# Patient Record
Sex: FEMALE | Race: WHITE | Employment: OTHER | URBAN - METROPOLITAN AREA
[De-identification: names, ages, dates, MRNs, and addresses within clinical notes are randomized per-mention and may not be internally consistent; named-entity substitution may affect disease eponyms.]

---

## 2017-01-30 PROBLEM — L84 TYLOMA: Status: ACTIVE | Noted: 2017-01-30

## 2017-03-03 PROBLEM — I77.1 TORTUOUS AORTA (HCC): Status: ACTIVE | Noted: 2017-03-03

## 2017-05-02 PROCEDURE — 82607 VITAMIN B-12: CPT | Performed by: OTHER

## 2017-08-04 PROBLEM — M17.11 PRIMARY OSTEOARTHRITIS OF RIGHT KNEE: Status: ACTIVE | Noted: 2017-08-04

## 2017-09-13 PROBLEM — C79.51 BONE METASTASES (HCC): Status: ACTIVE | Noted: 2017-09-13

## 2018-01-09 PROBLEM — D70.1 CHEMOTHERAPY-INDUCED NEUTROPENIA (HCC): Status: ACTIVE | Noted: 2018-01-09

## 2018-01-09 PROBLEM — T45.1X5A CHEMOTHERAPY-INDUCED NEUTROPENIA (HCC): Status: ACTIVE | Noted: 2018-01-09

## 2018-01-09 PROBLEM — E27.8 ADRENAL NODULE (HCC): Status: ACTIVE | Noted: 2018-01-09

## 2018-01-09 PROBLEM — J84.10 LUNG GRANULOMA (HCC): Status: ACTIVE | Noted: 2018-01-09

## 2018-01-11 ENCOUNTER — NURSE ONLY (OUTPATIENT)
Dept: LAB | Age: 78
End: 2018-01-11
Attending: INTERNAL MEDICINE
Payer: MEDICARE

## 2018-01-11 ENCOUNTER — LAB REQUISITION (OUTPATIENT)
Dept: LAB | Facility: HOSPITAL | Age: 78
End: 2018-01-11
Attending: INTERNAL MEDICINE
Payer: MEDICARE

## 2018-01-11 DIAGNOSIS — Z87.81 S/P RIGHT HIP FRACTURE: Primary | ICD-10-CM

## 2018-01-11 DIAGNOSIS — Z00.00 ENCOUNTER FOR GENERAL ADULT MEDICAL EXAMINATION WITHOUT ABNORMAL FINDINGS: ICD-10-CM

## 2018-01-11 LAB
ALBUMIN SERPL-MCNC: 3 G/DL (ref 3.5–4.8)
ALP LIVER SERPL-CCNC: 46 U/L (ref 55–142)
ALT SERPL-CCNC: 27 U/L (ref 14–54)
AST SERPL-CCNC: 20 U/L (ref 15–41)
BASOPHILS # BLD AUTO: 0.02 X10(3) UL (ref 0–0.1)
BASOPHILS NFR BLD AUTO: 0.6 %
BILIRUB SERPL-MCNC: 0.5 MG/DL (ref 0.1–2)
BUN BLD-MCNC: 7 MG/DL (ref 8–20)
CALCIUM BLD-MCNC: 8.4 MG/DL (ref 8.3–10.3)
CHLORIDE: 106 MMOL/L (ref 101–111)
CO2: 23 MMOL/L (ref 22–32)
CREAT BLD-MCNC: 0.35 MG/DL (ref 0.55–1.02)
EOSINOPHIL # BLD AUTO: 0.04 X10(3) UL (ref 0–0.3)
EOSINOPHIL NFR BLD AUTO: 1.1 %
ERYTHROCYTE [DISTWIDTH] IN BLOOD BY AUTOMATED COUNT: 15.9 % (ref 11.5–16)
GLUCOSE BLD-MCNC: 91 MG/DL (ref 70–99)
HCT VFR BLD AUTO: 33.9 % (ref 34–50)
HGB BLD-MCNC: 11.1 G/DL (ref 12–16)
IMMATURE GRANULOCYTE COUNT: 0.03 X10(3) UL (ref 0–1)
IMMATURE GRANULOCYTE RATIO %: 0.8 %
LYMPHOCYTES # BLD AUTO: 0.4 X10(3) UL (ref 0.9–4)
LYMPHOCYTES NFR BLD AUTO: 11.1 %
M PROTEIN MFR SERPL ELPH: 6.2 G/DL (ref 6.1–8.3)
MCH RBC QN AUTO: 30.8 PG (ref 27–33.2)
MCHC RBC AUTO-ENTMCNC: 32.7 G/DL (ref 31–37)
MCV RBC AUTO: 94.2 FL (ref 81–100)
MONOCYTES # BLD AUTO: 0.25 X10(3) UL (ref 0.1–0.6)
MONOCYTES NFR BLD AUTO: 6.9 %
NEUTROPHIL ABS PRELIM: 2.86 X10 (3) UL (ref 1.3–6.7)
NEUTROPHILS # BLD AUTO: 2.86 X10(3) UL (ref 1.3–6.7)
NEUTROPHILS NFR BLD AUTO: 79.5 %
PLATELET # BLD AUTO: 195 10(3)UL (ref 150–450)
POTASSIUM SERPL-SCNC: 3.7 MMOL/L (ref 3.6–5.1)
RBC # BLD AUTO: 3.6 X10(6)UL (ref 3.8–5.1)
RED CELL DISTRIBUTION WIDTH-SD: 55.6 FL (ref 35.1–46.3)
SODIUM SERPL-SCNC: 138 MMOL/L (ref 136–144)
WBC # BLD AUTO: 3.6 X10(3) UL (ref 4–13)

## 2018-01-11 PROCEDURE — 87147 CULTURE TYPE IMMUNOLOGIC: CPT | Performed by: INTERNAL MEDICINE

## 2018-01-11 PROCEDURE — 87086 URINE CULTURE/COLONY COUNT: CPT | Performed by: INTERNAL MEDICINE

## 2018-01-11 PROCEDURE — 80053 COMPREHEN METABOLIC PANEL: CPT

## 2018-01-11 PROCEDURE — 36415 COLL VENOUS BLD VENIPUNCTURE: CPT

## 2018-01-11 PROCEDURE — 81003 URINALYSIS AUTO W/O SCOPE: CPT | Performed by: INTERNAL MEDICINE

## 2018-01-11 PROCEDURE — 85025 COMPLETE CBC W/AUTO DIFF WBC: CPT

## 2018-01-12 LAB
BILIRUB UR QL STRIP.AUTO: NEGATIVE
CLARITY UR REFRACT.AUTO: CLEAR
COLOR UR AUTO: YELLOW
GLUCOSE UR STRIP.AUTO-MCNC: NEGATIVE MG/DL
KETONES UR STRIP.AUTO-MCNC: NEGATIVE MG/DL
LEUKOCYTE ESTERASE UR QL STRIP.AUTO: NEGATIVE
NITRITE UR QL STRIP.AUTO: NEGATIVE
PH UR STRIP.AUTO: 8 [PH] (ref 4.5–8)
PROT UR STRIP.AUTO-MCNC: NEGATIVE MG/DL
RBC UR QL AUTO: NEGATIVE
SP GR UR STRIP.AUTO: 1.01 (ref 1–1.03)
UROBILINOGEN UR STRIP.AUTO-MCNC: <2 MG/DL

## 2018-01-17 ENCOUNTER — NURSE ONLY (OUTPATIENT)
Dept: LAB | Age: 78
End: 2018-01-17
Attending: INTERNAL MEDICINE
Payer: MEDICARE

## 2018-01-17 DIAGNOSIS — R53.1 WEAKNESS: ICD-10-CM
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
## 2018-01-17 LAB
ALBUMIN SERPL-MCNC: 3.1 G/DL (ref 3.5–4.8)
ALP LIVER SERPL-CCNC: 48 U/L (ref 55–142)
ALT SERPL-CCNC: 30 U/L (ref 14–54)
AST SERPL-CCNC: 16 U/L (ref 15–41)
BASOPHILS # BLD AUTO: 0.02 X10(3) UL (ref 0–0.1)
BASOPHILS NFR BLD AUTO: 0.7 %
BILIRUB SERPL-MCNC: 0.4 MG/DL (ref 0.1–2)
BUN BLD-MCNC: 14 MG/DL (ref 8–20)
CALCIUM BLD-MCNC: 8.5 MG/DL (ref 8.3–10.3)
CHLORIDE: 105 MMOL/L (ref 101–111)
CO2: 25 MMOL/L (ref 22–32)
CREAT BLD-MCNC: 0.51 MG/DL (ref 0.55–1.02)
EOSINOPHIL # BLD AUTO: 0.05 X10(3) UL (ref 0–0.3)
EOSINOPHIL NFR BLD AUTO: 1.6 %
ERYTHROCYTE [DISTWIDTH] IN BLOOD BY AUTOMATED COUNT: 14.8 % (ref 11.5–16)
GLUCOSE BLD-MCNC: 85 MG/DL (ref 70–99)
HCT VFR BLD AUTO: 38 % (ref 34–50)
HGB BLD-MCNC: 12.5 G/DL (ref 12–16)
IMMATURE GRANULOCYTE COUNT: 0.01 X10(3) UL (ref 0–1)
IMMATURE GRANULOCYTE RATIO %: 0.3 %
LYMPHOCYTES # BLD AUTO: 0.61 X10(3) UL (ref 0.9–4)
LYMPHOCYTES NFR BLD AUTO: 20 %
M PROTEIN MFR SERPL ELPH: 6.8 G/DL (ref 6.1–8.3)
MCH RBC QN AUTO: 30.6 PG (ref 27–33.2)
MCHC RBC AUTO-ENTMCNC: 32.9 G/DL (ref 31–37)
MCV RBC AUTO: 93.1 FL (ref 81–100)
MONOCYTES # BLD AUTO: 0.19 X10(3) UL (ref 0.1–0.6)
MONOCYTES NFR BLD AUTO: 6.2 %
NEUTROPHIL ABS PRELIM: 2.17 X10 (3) UL (ref 1.3–6.7)
NEUTROPHILS # BLD AUTO: 2.17 X10(3) UL (ref 1.3–6.7)
NEUTROPHILS NFR BLD AUTO: 71.2 %
PLATELET # BLD AUTO: 280 10(3)UL (ref 150–450)
POTASSIUM SERPL-SCNC: 4 MMOL/L (ref 3.6–5.1)
RBC # BLD AUTO: 4.08 X10(6)UL (ref 3.8–5.1)
RED CELL DISTRIBUTION WIDTH-SD: 51.5 FL (ref 35.1–46.3)
SODIUM SERPL-SCNC: 138 MMOL/L (ref 136–144)
WBC # BLD AUTO: 3.1 X10(3) UL (ref 4–13)

## 2018-01-17 PROCEDURE — 80053 COMPREHEN METABOLIC PANEL: CPT

## 2018-01-17 PROCEDURE — 36415 COLL VENOUS BLD VENIPUNCTURE: CPT

## 2018-01-17 PROCEDURE — 85025 COMPLETE CBC W/AUTO DIFF WBC: CPT

## 2018-01-24 ENCOUNTER — NURSE ONLY (OUTPATIENT)
Dept: LAB | Age: 78
End: 2018-01-24
Attending: INTERNAL MEDICINE
Payer: MEDICARE

## 2018-01-24 DIAGNOSIS — M16.11 PRIMARY OSTEOARTHRITIS OF RIGHT HIP: Primary | ICD-10-CM

## 2018-01-24 DIAGNOSIS — R53.1 WEAKNESS: ICD-10-CM

## 2018-01-24 LAB
ALBUMIN SERPL-MCNC: 2.9 G/DL (ref 3.5–4.8)
ALP LIVER SERPL-CCNC: 77 U/L (ref 55–142)
ALT SERPL-CCNC: 22 U/L (ref 14–54)
AST SERPL-CCNC: 20 U/L (ref 15–41)
BASOPHILS # BLD AUTO: 0.01 X10(3) UL (ref 0–0.1)
BASOPHILS NFR BLD AUTO: 0.4 %
BILIRUB SERPL-MCNC: 0.4 MG/DL (ref 0.1–2)
BUN BLD-MCNC: 16 MG/DL (ref 8–20)
CALCIUM BLD-MCNC: 8.3 MG/DL (ref 8.3–10.3)
CHLORIDE: 105 MMOL/L (ref 101–111)
CO2: 27 MMOL/L (ref 22–32)
CREAT BLD-MCNC: 0.54 MG/DL (ref 0.55–1.02)
EOSINOPHIL # BLD AUTO: 0.05 X10(3) UL (ref 0–0.3)
EOSINOPHIL NFR BLD AUTO: 2 %
ERYTHROCYTE [DISTWIDTH] IN BLOOD BY AUTOMATED COUNT: 14.8 % (ref 11.5–16)
GLUCOSE BLD-MCNC: 98 MG/DL (ref 70–99)
HCT VFR BLD AUTO: 36.2 % (ref 34–50)
HGB BLD-MCNC: 11.9 G/DL (ref 12–16)
IMMATURE GRANULOCYTE COUNT: 0.01 X10(3) UL (ref 0–1)
IMMATURE GRANULOCYTE RATIO %: 0.4 %
LYMPHOCYTES # BLD AUTO: 0.47 X10(3) UL (ref 0.9–4)
LYMPHOCYTES NFR BLD AUTO: 18.4 %
M PROTEIN MFR SERPL ELPH: 6.5 G/DL (ref 6.1–8.3)
MCH RBC QN AUTO: 31.7 PG (ref 27–33.2)
MCHC RBC AUTO-ENTMCNC: 32.9 G/DL (ref 31–37)
MCV RBC AUTO: 96.5 FL (ref 81–100)
MONOCYTES # BLD AUTO: 0.23 X10(3) UL (ref 0.1–0.6)
MONOCYTES NFR BLD AUTO: 9 %
NEUTROPHIL ABS PRELIM: 1.79 X10 (3) UL (ref 1.3–6.7)
NEUTROPHILS # BLD AUTO: 1.79 X10(3) UL (ref 1.3–6.7)
NEUTROPHILS NFR BLD AUTO: 69.8 %
PLATELET # BLD AUTO: 256 10(3)UL (ref 150–450)
POTASSIUM SERPL-SCNC: 4.4 MMOL/L (ref 3.6–5.1)
RBC # BLD AUTO: 3.75 X10(6)UL (ref 3.8–5.1)
RED CELL DISTRIBUTION WIDTH-SD: 52.5 FL (ref 35.1–46.3)
SODIUM SERPL-SCNC: 138 MMOL/L (ref 136–144)
WBC # BLD AUTO: 2.6 X10(3) UL (ref 4–13)

## 2018-01-24 PROCEDURE — 80053 COMPREHEN METABOLIC PANEL: CPT

## 2018-01-24 PROCEDURE — 36415 COLL VENOUS BLD VENIPUNCTURE: CPT

## 2018-01-24 PROCEDURE — 85025 COMPLETE CBC W/AUTO DIFF WBC: CPT

## 2018-02-07 PROBLEM — D70.9 NEUTROPENIA (HCC): Status: ACTIVE | Noted: 2018-02-07

## 2018-02-14 PROBLEM — L84 TYLOMA: Status: RESOLVED | Noted: 2017-01-30 | Resolved: 2018-02-14

## 2018-02-14 PROBLEM — D70.9 NEUTROPENIA (HCC): Status: RESOLVED | Noted: 2018-02-07 | Resolved: 2018-02-14

## 2018-02-19 ENCOUNTER — APPOINTMENT (OUTPATIENT)
Dept: GENERAL RADIOLOGY | Facility: HOSPITAL | Age: 78
End: 2018-02-19
Attending: NURSE PRACTITIONER
Payer: MEDICARE

## 2018-02-19 ENCOUNTER — HOSPITAL ENCOUNTER (EMERGENCY)
Facility: HOSPITAL | Age: 78
Discharge: HOME | End: 2018-02-19
Attending: EMERGENCY MEDICINE
Payer: MEDICARE

## 2018-02-19 VITALS
SYSTOLIC BLOOD PRESSURE: 133 MMHG | DIASTOLIC BLOOD PRESSURE: 72 MMHG | OXYGEN SATURATION: 94 % | TEMPERATURE: 98 F | HEIGHT: 64 IN | WEIGHT: 135 LBS | BODY MASS INDEX: 23.05 KG/M2 | RESPIRATION RATE: 18 BRPM | HEART RATE: 85 BPM

## 2018-02-19 DIAGNOSIS — M54.59 INTRACTABLE LOW BACK PAIN: Primary | ICD-10-CM

## 2018-02-19 PROCEDURE — 99283 EMERGENCY DEPT VISIT LOW MDM: CPT

## 2018-02-19 PROCEDURE — 72110 X-RAY EXAM L-2 SPINE 4/>VWS: CPT | Performed by: NURSE PRACTITIONER

## 2018-02-19 NOTE — ED PROVIDER NOTES
Patient Seen in: [facility] Emergency Department    History   Patient presents with:  Back Pain (musculoskeletal)    Stated Complaint: back pain    60-year-old female presents today with complaints of lower back pain.   States pain radiates down both BREAST;                 Surgeon: Reginald Dickerson MD;  Location: [address] [place] [address]  10/30/2014: PATIENT WITH PREOPERATIVE ORDER FOR IV ANTIBIO* Left      Comment: Procedure: Lisa Patel / LUMPECTOMY BREAST;                 Surgeon: Oma Sinclair bilaterally. Skin: Skin is warm and dry. Nursing note and vitals reviewed.         ED Course   Labs Reviewed - No data to display    ED Course as of Feb 19 1935  ------------------------------------------------------------  Xr Lumbar Spine (min 4 Views) improved. Patient was also given a referral for orthopedics for continued pain. Patient returned ER with any numbness weakness or loss of bowel or bladder control.           Disposition and Plan     Clinical Impression:  Intractable low back pain  (primar

## 2018-03-07 PROCEDURE — 82746 ASSAY OF FOLIC ACID SERUM: CPT | Performed by: CLINICAL NURSE SPECIALIST

## 2018-03-07 PROCEDURE — 82607 VITAMIN B-12: CPT | Performed by: CLINICAL NURSE SPECIALIST

## 2018-05-03 PROBLEM — L84 CALLUS OF FOOT: Status: ACTIVE | Noted: 2017-01-30

## 2018-05-31 PROBLEM — M79.605 LEG PAIN, ANTERIOR, LEFT: Status: ACTIVE | Noted: 2018-05-31

## 2018-05-31 PROBLEM — M48.062 SPINAL STENOSIS OF LUMBAR REGION WITH NEUROGENIC CLAUDICATION: Status: ACTIVE | Noted: 2018-05-31

## 2018-07-25 PROBLEM — M47.816 LUMBAR SPONDYLOSIS: Status: ACTIVE | Noted: 2018-07-25

## 2018-07-25 PROBLEM — M51.36 DDD (DEGENERATIVE DISC DISEASE), LUMBAR: Status: ACTIVE | Noted: 2018-07-25

## 2018-07-25 PROBLEM — M41.9 SCOLIOSIS OF LUMBAR SPINE, UNSPECIFIED SCOLIOSIS TYPE: Status: ACTIVE | Noted: 2018-07-25

## 2018-11-25 PROBLEM — S92.302D CLOSED NONDISPLACED FRACTURE OF METATARSAL BONE OF LEFT FOOT WITH ROUTINE HEALING, UNSPECIFIED METATARSAL, SUBSEQUENT ENCOUNTER: Status: ACTIVE | Noted: 2018-11-25

## 2018-12-14 PROBLEM — M17.0 PRIMARY OSTEOARTHRITIS OF BOTH KNEES: Status: ACTIVE | Noted: 2018-12-14

## 2019-01-01 ENCOUNTER — APPOINTMENT (OUTPATIENT)
Dept: GENERAL RADIOLOGY | Facility: HOSPITAL | Age: 79
DRG: 392 | End: 2019-01-01
Attending: PHYSICIAN ASSISTANT
Payer: MEDICARE

## 2019-01-01 ENCOUNTER — HOSPITAL ENCOUNTER (INPATIENT)
Facility: HOSPITAL | Age: 79
LOS: 5 days | Discharge: HOME | DRG: 392 | End: 2019-01-01
Attending: EMERGENCY MEDICINE | Admitting: HOSPITALIST
Payer: MEDICARE

## 2019-01-01 ENCOUNTER — APPOINTMENT (OUTPATIENT)
Dept: GENERAL RADIOLOGY | Facility: HOSPITAL | Age: 79
DRG: 392 | End: 2019-01-01
Attending: INTERNAL MEDICINE
Payer: MEDICARE

## 2019-01-01 ENCOUNTER — APPOINTMENT (OUTPATIENT)
Dept: CT IMAGING | Facility: HOSPITAL | Age: 79
DRG: 392 | End: 2019-01-01
Attending: EMERGENCY MEDICINE
Payer: MEDICARE

## 2019-01-01 VITALS
TEMPERATURE: 99 F | WEIGHT: 130.06 LBS | HEART RATE: 103 BPM | SYSTOLIC BLOOD PRESSURE: 98 MMHG | OXYGEN SATURATION: 98 % | BODY MASS INDEX: 23 KG/M2 | DIASTOLIC BLOOD PRESSURE: 53 MMHG | RESPIRATION RATE: 18 BRPM

## 2019-01-01 DIAGNOSIS — K52.9 COLITIS: ICD-10-CM

## 2019-01-01 DIAGNOSIS — K92.2 GASTROINTESTINAL HEMORRHAGE, UNSPECIFIED GASTROINTESTINAL HEMORRHAGE TYPE: Primary | ICD-10-CM

## 2019-01-01 DIAGNOSIS — K57.90 DIVERTICULOSIS: ICD-10-CM

## 2019-01-01 PROCEDURE — 96361 HYDRATE IV INFUSION ADD-ON: CPT

## 2019-01-01 PROCEDURE — 74177 CT ABD & PELVIS W/CONTRAST: CPT | Performed by: EMERGENCY MEDICINE

## 2019-01-01 PROCEDURE — 87045 FECES CULTURE AEROBIC BACT: CPT | Performed by: EMERGENCY MEDICINE

## 2019-01-01 PROCEDURE — 82565 ASSAY OF CREATININE: CPT | Performed by: INTERNAL MEDICINE

## 2019-01-01 PROCEDURE — 80053 COMPREHEN METABOLIC PANEL: CPT | Performed by: EMERGENCY MEDICINE

## 2019-01-01 PROCEDURE — 84132 ASSAY OF SERUM POTASSIUM: CPT | Performed by: HOSPITALIST

## 2019-01-01 PROCEDURE — 80048 BASIC METABOLIC PNL TOTAL CA: CPT | Performed by: HOSPITALIST

## 2019-01-01 PROCEDURE — 85007 BL SMEAR W/DIFF WBC COUNT: CPT | Performed by: HOSPITALIST

## 2019-01-01 PROCEDURE — 87493 C DIFF AMPLIFIED PROBE: CPT | Performed by: EMERGENCY MEDICINE

## 2019-01-01 PROCEDURE — 99285 EMERGENCY DEPT VISIT HI MDM: CPT

## 2019-01-01 PROCEDURE — 87077 CULTURE AEROBIC IDENTIFY: CPT | Performed by: EMERGENCY MEDICINE

## 2019-01-01 PROCEDURE — 85025 COMPLETE CBC W/AUTO DIFF WBC: CPT | Performed by: EMERGENCY MEDICINE

## 2019-01-01 PROCEDURE — 82272 OCCULT BLD FECES 1-3 TESTS: CPT

## 2019-01-01 PROCEDURE — 83690 ASSAY OF LIPASE: CPT | Performed by: EMERGENCY MEDICINE

## 2019-01-01 PROCEDURE — 87086 URINE CULTURE/COLONY COUNT: CPT | Performed by: INTERNAL MEDICINE

## 2019-01-01 PROCEDURE — 81001 URINALYSIS AUTO W/SCOPE: CPT | Performed by: EMERGENCY MEDICINE

## 2019-01-01 PROCEDURE — 85027 COMPLETE CBC AUTOMATED: CPT | Performed by: HOSPITALIST

## 2019-01-01 PROCEDURE — 81001 URINALYSIS AUTO W/SCOPE: CPT | Performed by: INTERNAL MEDICINE

## 2019-01-01 PROCEDURE — 74019 RADEX ABDOMEN 2 VIEWS: CPT | Performed by: INTERNAL MEDICINE

## 2019-01-01 PROCEDURE — 87427 SHIGA-LIKE TOXIN AG IA: CPT | Performed by: EMERGENCY MEDICINE

## 2019-01-01 PROCEDURE — 85025 COMPLETE CBC W/AUTO DIFF WBC: CPT | Performed by: HOSPITALIST

## 2019-01-01 PROCEDURE — 87186 SC STD MICRODIL/AGAR DIL: CPT | Performed by: EMERGENCY MEDICINE

## 2019-01-01 PROCEDURE — 82272 OCCULT BLD FECES 1-3 TESTS: CPT | Performed by: EMERGENCY MEDICINE

## 2019-01-01 PROCEDURE — 87086 URINE CULTURE/COLONY COUNT: CPT | Performed by: EMERGENCY MEDICINE

## 2019-01-01 PROCEDURE — 83605 ASSAY OF LACTIC ACID: CPT | Performed by: EMERGENCY MEDICINE

## 2019-01-01 PROCEDURE — 74019 RADEX ABDOMEN 2 VIEWS: CPT | Performed by: PHYSICIAN ASSISTANT

## 2019-01-01 PROCEDURE — 87046 STOOL CULTR AEROBIC BACT EA: CPT | Performed by: EMERGENCY MEDICINE

## 2019-01-01 RX ORDER — MORPHINE SULFATE 4 MG/ML
1 INJECTION, SOLUTION INTRAMUSCULAR; INTRAVENOUS EVERY 2 HOUR PRN
Status: DISCONTINUED | OUTPATIENT
Start: 2019-01-01 | End: 2019-01-01

## 2019-01-01 RX ORDER — LEVOFLOXACIN 5 MG/ML
750 INJECTION, SOLUTION INTRAVENOUS
Status: DISCONTINUED | OUTPATIENT
Start: 2019-01-01 | End: 2019-01-01

## 2019-01-01 RX ORDER — SODIUM CHLORIDE 9 MG/ML
INJECTION, SOLUTION INTRAVENOUS CONTINUOUS
Status: DISCONTINUED | OUTPATIENT
Start: 2019-01-01 | End: 2019-01-01

## 2019-01-01 RX ORDER — ONDANSETRON 2 MG/ML
4 INJECTION INTRAMUSCULAR; INTRAVENOUS EVERY 6 HOURS PRN
Status: DISCONTINUED | OUTPATIENT
Start: 2019-01-01 | End: 2019-01-01

## 2019-01-01 RX ORDER — MORPHINE SULFATE 4 MG/ML
2 INJECTION, SOLUTION INTRAMUSCULAR; INTRAVENOUS EVERY 2 HOUR PRN
Status: DISCONTINUED | OUTPATIENT
Start: 2019-01-01 | End: 2019-01-01

## 2019-01-01 RX ORDER — SODIUM CHLORIDE 9 MG/ML
1000 INJECTION, SOLUTION INTRAVENOUS ONCE
Status: DISCONTINUED | OUTPATIENT
Start: 2019-01-01 | End: 2019-01-01

## 2019-01-01 RX ORDER — POTASSIUM CHLORIDE 14.9 MG/ML
20 INJECTION INTRAVENOUS ONCE
Status: COMPLETED | OUTPATIENT
Start: 2019-01-01 | End: 2019-01-01

## 2019-01-01 RX ORDER — LEVOFLOXACIN 750 MG/1
750 TABLET ORAL DAILY
Qty: 5 TABLET | Refills: 0 | Status: SHIPPED | OUTPATIENT
Start: 2019-01-01 | End: 2019-01-01

## 2019-01-01 RX ORDER — METOCLOPRAMIDE HYDROCHLORIDE 5 MG/ML
5 INJECTION INTRAMUSCULAR; INTRAVENOUS EVERY 8 HOURS PRN
Status: DISCONTINUED | OUTPATIENT
Start: 2019-01-01 | End: 2019-01-01

## 2019-01-01 RX ORDER — LEVOFLOXACIN 5 MG/ML
750 INJECTION, SOLUTION INTRAVENOUS EVERY 24 HOURS
Status: DISCONTINUED | OUTPATIENT
Start: 2019-01-01 | End: 2019-01-01 | Stop reason: SDUPTHER

## 2019-01-01 RX ORDER — METRONIDAZOLE 500 MG/100ML
500 INJECTION, SOLUTION INTRAVENOUS EVERY 8 HOURS
Status: DISCONTINUED | OUTPATIENT
Start: 2019-01-01 | End: 2019-01-01

## 2019-01-01 RX ORDER — LETROZOLE 2.5 MG/1
2.5 TABLET, FILM COATED ORAL DAILY
Status: DISCONTINUED | OUTPATIENT
Start: 2019-01-01 | End: 2019-01-01

## 2019-01-01 RX ORDER — SODIUM CHLORIDE, SODIUM LACTATE, POTASSIUM CHLORIDE, CALCIUM CHLORIDE 600; 310; 30; 20 MG/100ML; MG/100ML; MG/100ML; MG/100ML
INJECTION, SOLUTION INTRAVENOUS CONTINUOUS
Status: DISCONTINUED | OUTPATIENT
Start: 2019-01-01 | End: 2019-01-01

## 2019-01-01 RX ORDER — SODIUM CHLORIDE 9 MG/ML
INJECTION, SOLUTION INTRAVENOUS CONTINUOUS
Status: ACTIVE | OUTPATIENT
Start: 2019-01-01 | End: 2019-01-01

## 2019-01-01 RX ORDER — METRONIDAZOLE 500 MG/1
500 TABLET ORAL 3 TIMES DAILY
Qty: 15 TABLET | Refills: 0 | Status: SHIPPED | OUTPATIENT
Start: 2019-01-01 | End: 2019-01-01

## 2019-01-01 RX ORDER — MORPHINE SULFATE 4 MG/ML
4 INJECTION, SOLUTION INTRAMUSCULAR; INTRAVENOUS EVERY 2 HOUR PRN
Status: DISCONTINUED | OUTPATIENT
Start: 2019-01-01 | End: 2019-01-01

## 2019-01-01 RX ORDER — METRONIDAZOLE 500 MG/1
500 TABLET ORAL EVERY 8 HOURS SCHEDULED
Status: DISCONTINUED | OUTPATIENT
Start: 2019-01-01 | End: 2019-01-01

## 2019-01-01 RX ORDER — LEVOFLOXACIN 5 MG/ML
750 INJECTION, SOLUTION INTRAVENOUS EVERY 24 HOURS
Status: DISCONTINUED | OUTPATIENT
Start: 2019-01-01 | End: 2019-01-01

## 2019-01-01 RX ORDER — DONEPEZIL HYDROCHLORIDE 10 MG/1
10 TABLET, FILM COATED ORAL DAILY
Status: DISCONTINUED | OUTPATIENT
Start: 2019-01-01 | End: 2019-01-01

## 2019-01-01 RX ORDER — HEPARIN SODIUM 5000 [USP'U]/ML
5000 INJECTION, SOLUTION INTRAVENOUS; SUBCUTANEOUS EVERY 8 HOURS SCHEDULED
Status: DISCONTINUED | OUTPATIENT
Start: 2019-01-01 | End: 2019-01-01

## 2019-01-24 PROBLEM — F11.20 UNCOMPLICATED OPIOID DEPENDENCE (HCC): Status: ACTIVE | Noted: 2019-01-24

## 2019-07-30 PROBLEM — D61.818 PANCYTOPENIA (HCC): Status: ACTIVE | Noted: 2019-07-30

## 2019-08-29 PROBLEM — M54.16 LUMBAR RADICULITIS: Status: ACTIVE | Noted: 2019-08-29

## 2019-10-16 PROBLEM — K92.2 GASTROINTESTINAL HEMORRHAGE: Status: ACTIVE | Noted: 2019-01-01

## 2019-10-16 PROBLEM — K92.2 GASTROINTESTINAL HEMORRHAGE, UNSPECIFIED GASTROINTESTINAL HEMORRHAGE TYPE: Status: ACTIVE | Noted: 2019-01-01

## 2019-10-16 PROBLEM — K57.90 DIVERTICULOSIS: Status: ACTIVE | Noted: 2019-01-01

## 2019-10-16 PROBLEM — K52.9 COLITIS: Status: ACTIVE | Noted: 2019-01-01

## 2019-10-16 NOTE — ED NOTES
Family at bedside. Pt sitting at [facility] eating her food tray.

## 2019-10-16 NOTE — ED INITIAL ASSESSMENT (HPI)
Pt presents to ER with chronic diarrhea, worse over past 2 days with red blood noted in diarrhea. Pt has also noted abd pains. Yes pain with urination. Yes vomit x1. Pt is speaking clearly. Skin pale warm dry. Pt is alert to self, situation.  Confused to [employer]

## 2019-10-16 NOTE — ED PROVIDER NOTES
Patient Seen in: [facility] Emergency Department      History   Patient presents with:  GI Bleeding (gastrointestinal)    Stated Complaint: dairrhea x 2 days, noticed blood in stool yesterday     HPI    The patient is an 48-year-old female who is sc PAIN MANAGEMENT   • LUMBAR RADIOFREQUENCY N/A 5/6/2019    Performed by Nick Sher MD at [address]   • LUMPECTOMY LEFT     • MASTECTOMY LEFT  1/2015    [facility]   • MASTECTOMY UNILATERAL Right 1/15/2015    Performed by Jessee Mann rhythm without murmurs rubs or gallops, no peripheral edema or JVD  Lungs: Speaking full sentences without any distress or retractions. Clear to auscultation bilaterally no wheezes or rales or rhonchi. Abdomen: Normoactive bowel sounds.  Soft, nondistende (*)     RDW 17.7 (*)     RDW-SD 62.0 (*)     Lymphocyte Absolute 0.47 (*)     All other components within normal limits   LIPASE - Normal   LACTIC ACID, PLASMA - Normal   CBC WITH DIFFERENTIAL WITH PLATELET    Narrative:      The following orders were creat of the diaphragm to     the pubic symphysis with non-ionic intravenous contrast material. Post     contrast coronal MPR imaging was performed. Dose reduction techniques     were used.  Dose information is     transmitted to the [employer] [place] right pericolic gutter and dependent     pelvis. There are uncomplicated diverticular changes of the sigmoid     colon. ABDOMINAL WALL:  No mass or hernia. URINARY BLADDER:  No visible focal wall thickening, lesion, or calculus.       PELVIC NODES: gastroenterology, as well as the [facility] hospitalist service, Dr. Roland Tsai, who will admit the patient to her service and she was admitted in stable condition.         Admission disposition: 10/16/2019  4:04 PM                   Disposition and Plan     Cli

## 2019-10-16 NOTE — H&P
[facility] Hospitalist History and Physical      CC: abd pain    PCP: Ann Hutchinson MD    History of Present Illness: Patient is a [de-identified]year old female with PMH sig for metastatic breast cancer on ibrance here with abd pain and diarrhea.  She reports she has [place] , Rfl:           Current Meds:  Scheduled:   Continuous Infusions:   • sodium chloride Stopped (10/16/19 1018)   • sodium chloride       PRN:     Allergies:    No Known Allergies           Past Social Hx:  Denies heavy ETOH use  Denies illicts    Fam Hx  F involving the sigmoid region. 3. There is evidence of metastatic disease to the visualized skeleton with multiple blastic lesions, as well as evidence of pathologic fractures of bilateral sacral ala and bilateral superior and inferior pubic rami.   4. Low-

## 2019-10-17 NOTE — PROGRESS NOTES
[facility] Hospitalist Progress Note                                                                   [address]  7/6/1940    CC:  Mikhail abd pain    Interval History:  - Feels about the same today 20.0* 19.0*           ROS: no change to ROS from my documentation yesterday, except as otherwise noted in the Interval History above.     Assessment/Plan:    # Colitis  - Abd pain, diarrhea, some blood in stools- sounds like small amt and hgb stable  - Cdif

## 2019-10-17 NOTE — CONSULTS
[facility]  Report of Consultation    Stivenashlyn De Jesusett Patient Status:  Inpatient    1940 MRN [id number]   [facility] 4NW-A Attending Kp Chaudhry   Hosp Day # 1 PCP Jozef Lazo MD     10/17/19    Reason for Consult by Charyl Hatchet, MD at [address] N/A 5/6/2019    Performed by Charyl Hatchet, MD at [address]   • LUMPECTOMY LEFT     • MASTECTOMY LEFT  1/2015    [facility]   • MASTECTOMY UNILATERAL Right (REGLAN) injection 5 mg, 5 mg, Intravenous, Q8H PRN  •  0.9% NaCl infusion, 1,000 mL, Intravenous, Once  •  levoFLOXacin in D5W (LEVAQUIN) IVPB premix 750 mg, 750 mg, Intravenous, Q48H  •  metRONIDAZOLE (FLAGYL) tab 500 mg, 500 mg, Oral, Q8H [facility]    Home Me labored breathing    CARDIOVASCULAR: RRR    ABDOMEN: diffuse tenderness, soft, mildly distended    LYMPHATIC: no lymphadenopathy    EXTREMITIES: no cyanosis, clubbing or edema    .     Laboratory Data:  Recent Labs   Lab 10/16/19  1238 10/17/19  0528   WBC wall thickening or surrounding inflammatory changes. ADRENALS:  There is a low-density right adrenal gland nodule along the apex of the gland measuring 2.0 cm in maximal dimension.   There is an internal Hounsfield density of approximately 17, consistent wi region. 3. There is evidence of metastatic disease to the visualized skeleton with multiple blastic lesions, as well as evidence of pathologic fractures of bilateral sacral ala and bilateral superior and inferior pubic rami.  4. Low-density adrenal adenoma Megan Parra  Group  General Surgery  10/17/2019

## 2019-10-17 NOTE — PLAN OF CARE
A&Ox1-2, pleasantly confused. Easily reoriented throughout the day. Daughter at the bedside this AM, reviewed plan of care. Strict NPO per GI. CT showed right sided colitis.  General surgery on consult- orders for obstructive series today and repeat in the consumed  - Identify factors contributing to decreased intake, treat as appropriate  - Assist with meals as needed  - Monitor I&O, WT and lab values  - Obtain nutritional consult as needed  - Optimize oral hygiene and moisture  - Encourage food from home; intramuscular injections, enemas and rectal medication administration  - Ensure safe mobilization of patient  - Hold pressure on venipuncture sites to achieve adequate hemostasis  - Assess for signs and symptoms of internal bleeding  - Monitor lab trends unsuccessful or patient reports new pain  - Anticipate increased pain with activity and pre-medicate as appropriate  Outcome: Progressing     Problem: RISK FOR INFECTION - ADULT  Goal: Absence of fever/infection during anticipated neutropenic period  Descr

## 2019-10-17 NOTE — PROGRESS NOTES
[facility] Pharmacy Note:  Renal Adjustment for levofloxacin (Noa Paz)    Lana Bautista is a [de-identified]year old female who has been prescribed levofloxacin (LEVAQUIN) 500 mg IVPB every 24 hrs. CrCl is estimated creatinine clearance is 35 mL/min (A) (based on SCr of 1.

## 2019-10-17 NOTE — PROGRESS NOTES
[facility] Pharmacy Note:  Renal Dose Adjustment for Metoclopramide (REGLAN)    Emperatriz Ovalles has been prescribed Metoclopramide (REGLAN) 10 mg IVPB every 8 hours as needed for nausea/vomiting.     Estimated Creatinine Clearance: 35 mL/min (A) (based on SCr of 1.0

## 2019-10-17 NOTE — PLAN OF CARE
NURSING ADMISSION NOTE      Patient admitted via Cart  Oriented to room. Safety precautions initiated. Bed in low position. Call light in reach.     Pt alert and oriented x4, vitals stable, mild pain to the abdomen, denied pain meds at this time, nayeli

## 2019-10-17 NOTE — CONSULTS
[facility] Department of  Gastroenterology    History & Physical    [facility] Patient Status:  Inpatient    1940 MRN [id number]   [facility] 4NW-A Attending Eloisa Leyva,*   Hosp Day # 1 PCP Chelly Del Rio MD MD at . [address] N/A 5/6/2019    Performed by Tracy Gallardo MD at [address]   • LUMPECTOMY LEFT     • MASTECTOMY LEFT  1/2015    [address] [facility]   • MASTECTOMY UNILATERAL Right 1/15/2015    Perform not use drugs.     Family History:    Family History   Problem Relation Age of Onset   • Hypertension Father    • Cancer Mother         lung   • Diabetes Brother    • Diabetes Brother        Review of Systems:  Gastrointestinal: +abdominal pain, diarrhea, h wall thickening circumferentially extending from the cecum to the splenic flexure suggesting colitis of uncertain etiology. Mild surrounding inflammatory changes and mild ascites. 2. Uncomplicated diverticulosis of the sigmoid colon.   No diverticulitis o

## 2019-10-18 NOTE — PLAN OF CARE
Pt alert and oriented this shift. VSS. Repeat obstructive series done today. Strict NPO maintained per surgery. Flagyl infusing. Potassium replaced per protocol. Pt and family updated on POC. Will continue to monitor.

## 2019-10-18 NOTE — PROGRESS NOTES
10/17/19 8311   Clinical Encounter Type   Visited With Patient not available  (Asked nurse Tisha Boston if patient was decisional. She said no.  She said the family would be in in the morning. )   Continue Visiting Yes   Referral From Other (Comment)  (consult

## 2019-10-18 NOTE — DIETARY NOTE
[facility]    NUTRITION INITIAL ASSESSMENT    NUTRITION DIAGNOSIS/PROBLEM:    Inadequate energy intake related to planned medical therapy and alteration in GI function (colitis) as evidenced by NPO status. NUTRITION INTERVENTION:      1.  Meal and S

## 2019-10-18 NOTE — PROGRESS NOTES
[facility]  Progress Note    Ramez Aaron Patient Status:  Inpatient    1940 MRN [id number]   [facility] 4NW-A Attending Chirag Wells,*   Hosp Day # 2 PCP Carlitos Agustin MD     Subjective:    Patient reports abdomina lumbar spine, unspecified scoliosis type     Closed nondisplaced fracture of metatarsal bone of left foot with routine healing, unspecified metatarsal, subsequent encounter     Primary osteoarthritis of both knees     Uncomplicated opioid dependence ([facility] [address]

## 2019-10-18 NOTE — PROGRESS NOTES
10/18/19 1611   Clinical Encounter Type   Routine Visit Follow-up  (Patient has full code status.  POLST paperwork may be completed after physician consultation and DNR code status is in place.)   Continue Visiting No  (upon request or as needed)

## 2019-10-18 NOTE — PROGRESS NOTES
[facility] Hospitalist Progress Note                                                                   [address]  7/6/1940    CC:  Fu abd pain    Interval History:  - Pain unchanged today- still 57* 106  --  111  --    GFRNAA 50* 92  --  96  --    CA 8.6 7.0*  --  7.6*  --    * 142  --  142  --    K 3.9 3.1* 3.7 3.7 3.4*    114*  --  115*  --    CO2 20.0* 19.0*  --  22.0  --            ROS: no change to ROS from my documentation yester

## 2019-10-18 NOTE — PROGRESS NOTES
[address]  7/6/1940  [id number]   Provider:    Lupillo Chand MD         No nausea or emesis. Multiple bouts diarrhea. Persistent abdominal pain. Afebrile.   Allergies:    No Known Allergies           Current Outpatient Prescription MEDIAL BRANCH NERVE BLOCK Bilateral 1/23/2019    Performed by Rita Aguilar MD at [address] Left 10/22/2018    Performed by Rita Aguilar MD at [address] [facility] 22 region of the cecum is noted. Air-filled distention of colon in this region is noted. CALCIFICATIONS:  Blastic metastatic disease in the pelvis is again identified.   Lab Results   Component Value Date    WBC 3.8 10/18/2019    HGB 7.8 10/18/2019    HCT 24

## 2019-10-19 NOTE — PROGRESS NOTES
[address]  7/6/1940  [id number]   Provider:    Qiana Smyth MD         No nausea or emesis. Having BMs. No abdominal pain. Ambulating. Hungry.    Allergies:    No Known Allergies           Current Outpatient Prescriptions:    Curr BRANCH NERVE BLOCK Left 10/22/2018    Performed by Rodríguez Busby MD at [address] N/A 5/6/2019    Performed by Rodríguez Busby MD at [address]   • LUMPECTOMY LEFT     • MASTECTOMY LEF Value Date    K 3.9 10/19/2019         ASSESSMENT AND PLAN:   1. Colitis  No SMA thrombus to suggest right colon ischemic colitis. Per heme onc, ibrance not associated with autoimmune colitis. Likely infectious. C diff negative.     Markedly improved abdomi

## 2019-10-19 NOTE — PLAN OF CARE
Received patient in handoff. States she is very hungry. \" Have not eaten since Wed. Morphine 2 mg given x1 for discomfort and not being able to sleep. Was not effective for sleep. She stated if \" I could eat I probably would not have pain.  Reminded her s

## 2019-10-19 NOTE — PROGRESS NOTES
[facility]  Progress Note    Eloy Salcedo Patient Status:  Inpatient    1940 MRN [id number]   [facility] 4NW-A Attending Fozia Cline MD   [facility] Day # 3 PCP Areli Leone MD     Subjective:    Mild pain.   Hungry    Obje Diverticulosis          Impression:     Colitis    Plan:    Begin clear liquid diet.       Mirza Galvin  10/19/2019

## 2019-10-19 NOTE — PROGRESS NOTES
[facility] Hospitalist Progress Note                                                                   [address]  7/6/1940    CC:  Fu abd pain    Interval History:  - Laying in bed, pain/diarrhe --   --    GFRNAA 50* 92  --  96  --   --    CA 8.6 7.0*  --  7.6*  --   --    * 142  --  142  --   --    K 3.9 3.1*   < > 3.7 3.4* 3.9    114*  --  115*  --   --    CO2 20.0* 19.0*  --  22.0  --   --     < > = values in this interval not displ

## 2019-10-20 NOTE — CONSULTS
[facility] Pharmacy Note:  Renal Adjustment for levofloxacin (Kasia Huerta)    Cathy Baird is a [de-identified]year old female who has been prescribed levofloxacin (LEVAQUIN) 750 mg every 48 hrs.   CrCl is estimated creatinine clearance is 103.1 mL/min (A) (based on SCr of 0.36

## 2019-10-20 NOTE — PROGRESS NOTES
[facility] Hospitalist Progress Note                                                                   [address]  7/6/1940    CC: Mikhail abd pain    Interval History:  - Sitting in bed.  Diarrhea/pa 50* 92  --  96  --   --  102   CA 8.6 7.0*  --  7.6*  --   --   --    * 142  --  142  --   --   --    K 3.9 3.1*   < > 3.7 3.4* 3.9  --     114*  --  115*  --   --   --    CO2 20.0* 19.0*  --  22.0  --   --   --     < > = values in this interva

## 2019-10-20 NOTE — PROGRESS NOTES
[facility]  7/6/1940  [id number]   Provider:    Areli Leone MD         No nausea or emesis. Having liquid BMs. Tolerating full liquid diet w/o abdominal pain.    Allergies:    No Known Allergies           Current Outpatient Prescr INJECTION OR MEDIAL BRANCH NERVE BLOCK Left 10/22/2018    Performed by Sandoval Cooper MD at [address] N/A 5/6/2019    Performed by Sandoval Cooper MD at [address] [place] [address]   • LUMPECTOMY LEFT restart letrozole   - hold ibrance until antibiotic therapy completed  - would complete 7 day course of levofloxacin, metronidazole  - c diff neg, stool cx w/ shiga neg  - low residue diet, if tolerates consider discharge tomorrow  - if diarrhea continues,

## 2019-10-20 NOTE — PLAN OF CARE
NO complaints of pain & nausea. Ambulating to the bathroom for B & B needs. Concerned about being able to eat real food this morning. Diet advanced to full as tolerated as ordered by surgery. Will continue to monitor.  Remains on IV flagyl w/ no adverse elroy

## 2019-10-20 NOTE — PROGRESS NOTES
[facility]  Progress Note    Marlen King Patient Status:  Inpatient    1940 MRN [id number]   [facility] 4NW-A Attending Merlin Wick MD   [facility] Day # 4 PCP Kishore Chowdary MD     Subjective:    Patient hungry.   Nicky brooks Discharge per medical service      Keila Linda  10/20/2019

## 2019-10-21 NOTE — DIETARY NOTE
[facility]   CLINICAL NUTRITION    Akash Chow     Admitting diagnosis:  Colitis [K52.9]  Diverticulosis [K57.90]  Gastrointestinal hemorrhage, unspecified gastrointestinal hemorrhage type [K92.2]    Ht:  160 cm (5'3\")  Wt: 59 kg (130 lb 1.1 oz).  Katheryn Mcgowan

## 2019-10-21 NOTE — DISCHARGE SUMMARY
[facility] Internal Medicine Discharge Summary   Patient ID:  Jeferson Matta  [id number]  2 year old  7/6/1940    Admit date: 10/16/2019    Discharge date and time: 10/21/2019     Attending Physician: Diego Isaac MD     Primary Care Physician: Katharina Landin hold ibrance until antibiotic therapy completed     # Known metastatic breast CA to bone  - No new back pain     # Anemia: having some blood in stool 2/2 colitis, also on IVF, trend, transfuse for Hbg < 7  -hgb 7.8     # Hyperchloremic metabolic acidosis; Where to Get Your Medications      These medications were sent to [place] [address] [address] [place] [address], [address] ([facility], [phone], [address] [place] [address] [facility] [address] [address]    Phone:  [phone] ADRENALS:  There is a low-density right adrenal gland nodule along the apex of the gland measuring 2.0 cm in maximal dimension. There is an internal Hounsfield density of approximately 17, consistent with a benign adrenal adenoma.  AORTA/VASCULAR:  Mild to visualized skeleton with multiple blastic lesions, as well as evidence of pathologic fractures of bilateral sacral ala and bilateral superior and inferior pubic rami. 4. Low-density adrenal adenoma of the right adrenal gland. Dictated by:  Britton Hashimoto, D INDICATIONS:  abdominal pain  PATIENT STATED HISTORY: (As transcribed by Technologist)  Patient has pain in her lower abdomen that began 1 week ago. Pain is worse in the lower right quadrant and has worsened since this morning.     FINDINGS:  BOWEL GAS SUSI does not appear dilated. No small bowel obstruction. 2. Blastic bony metastatic disease.      Dictated by: Alcon May MD on 10/17/2019 at 14:53     Approved by: Alcon May MD on 10/17/2019 at 14:56             Operative Procedures:      [facility]

## 2019-10-21 NOTE — PLAN OF CARE
Problem: GASTROINTESTINAL - ADULT  Goal: Minimal or absence of nausea and vomiting  Description  INTERVENTIONS:  - Maintain adequate hydration with IV or PO as ordered and tolerated  - Nasogastric tube to low intermittent suction as ordered  - Evaluate e appropriate  - Fluid restriction as ordered  - Instruct patient on fluid and nutrition restrictions as appropriate  Outcome: Adequate for Discharge  Goal: Hemodynamic stability and optimal renal function maintained  Description  INTERVENTIONS:  - Monitor l

## 2019-10-21 NOTE — PLAN OF CARE
Pt alert and oriented, some periods of confusion. Easily reoriented. VSS, afebrile. Denies pain or SOB. Denies nausea, no bowel movements noted this shift. Tolerated soft diet well. SpO2 within normal limits on room air.  Ambulating to the bathroom with one and symptoms of volume excess or deficit  - Monitor intake, output and patient weight  - Monitor urine specific gravity, serum osmolarity and serum sodium as indicated or ordered  - Monitor response to interventions for patient's volume status, including l changes in neurological status  - Initiate measures to prevent increased intracranial pressure  - Maintain blood pressure and fluid volume within ordered parameters to optimize cerebral perfusion and minimize risk of hemorrhage  - Monitor temperature, gluc schedule  Outcome: Progressing

## 2019-12-12 PROBLEM — J84.10 LUNG GRANULOMA (HCC): Status: RESOLVED | Noted: 2018-01-09 | Resolved: 2019-01-01

## 2019-12-12 PROBLEM — F03.90 DEMENTIA WITHOUT BEHAVIORAL DISTURBANCE, UNSPECIFIED DEMENTIA TYPE (HCC): Status: ACTIVE | Noted: 2019-01-01

## 2020-01-01 ENCOUNTER — OFFICE VISIT (OUTPATIENT)
Dept: HEMATOLOGY/ONCOLOGY | Age: 80
End: 2020-01-01
Attending: INTERNAL MEDICINE
Payer: MEDICARE

## 2020-01-01 ENCOUNTER — OFFICE VISIT (OUTPATIENT)
Dept: HEMATOLOGY/ONCOLOGY | Facility: HOSPITAL | Age: 80
End: 2020-01-01
Attending: INTERNAL MEDICINE
Payer: MEDICARE

## 2020-01-01 ENCOUNTER — ANESTHESIA (OUTPATIENT)
Dept: ENDOSCOPY | Facility: HOSPITAL | Age: 80
DRG: 377 | End: 2020-01-01
Payer: MEDICARE

## 2020-01-01 ENCOUNTER — APPOINTMENT (OUTPATIENT)
Dept: GENERAL RADIOLOGY | Facility: HOSPITAL | Age: 80
DRG: 377 | End: 2020-01-01
Attending: EMERGENCY MEDICINE
Payer: MEDICARE

## 2020-01-01 ENCOUNTER — TELEPHONE (OUTPATIENT)
Dept: HEMATOLOGY/ONCOLOGY | Facility: HOSPITAL | Age: 80
End: 2020-01-01

## 2020-01-01 ENCOUNTER — ANESTHESIA EVENT (OUTPATIENT)
Dept: ENDOSCOPY | Facility: HOSPITAL | Age: 80
DRG: 377 | End: 2020-01-01
Payer: MEDICARE

## 2020-01-01 ENCOUNTER — HOSPITAL ENCOUNTER (INPATIENT)
Facility: HOSPITAL | Age: 80
LOS: 3 days | Discharge: HOME HEALTH | DRG: 377 | End: 2020-01-01
Attending: EMERGENCY MEDICINE | Admitting: INTERNAL MEDICINE
Payer: MEDICARE

## 2020-01-01 VITALS
SYSTOLIC BLOOD PRESSURE: 108 MMHG | BODY MASS INDEX: 25 KG/M2 | OXYGEN SATURATION: 94 % | WEIGHT: 142 LBS | TEMPERATURE: 98 F | HEART RATE: 95 BPM | DIASTOLIC BLOOD PRESSURE: 66 MMHG | RESPIRATION RATE: 16 BRPM

## 2020-01-01 VITALS
OXYGEN SATURATION: 98 % | TEMPERATURE: 99 F | RESPIRATION RATE: 18 BRPM | WEIGHT: 138.38 LBS | HEART RATE: 103 BPM | SYSTOLIC BLOOD PRESSURE: 134 MMHG | BODY MASS INDEX: 25 KG/M2 | DIASTOLIC BLOOD PRESSURE: 70 MMHG

## 2020-01-01 VITALS
BODY MASS INDEX: 24 KG/M2 | OXYGEN SATURATION: 96 % | TEMPERATURE: 98 F | RESPIRATION RATE: 26 BRPM | DIASTOLIC BLOOD PRESSURE: 53 MMHG | WEIGHT: 134.06 LBS | SYSTOLIC BLOOD PRESSURE: 111 MMHG | HEART RATE: 99 BPM

## 2020-01-01 VITALS
HEART RATE: 105 BPM | SYSTOLIC BLOOD PRESSURE: 128 MMHG | RESPIRATION RATE: 18 BRPM | DIASTOLIC BLOOD PRESSURE: 66 MMHG | OXYGEN SATURATION: 94 % | TEMPERATURE: 100 F

## 2020-01-01 DIAGNOSIS — D64.9 ANEMIA, UNSPECIFIED: Primary | ICD-10-CM

## 2020-01-01 DIAGNOSIS — K92.2 GASTROINTESTINAL HEMORRHAGE, UNSPECIFIED GASTROINTESTINAL HEMORRHAGE TYPE: ICD-10-CM

## 2020-01-01 DIAGNOSIS — D64.9 SYMPTOMATIC ANEMIA: Primary | ICD-10-CM

## 2020-01-01 LAB
ANTIBODY SCREEN: NEGATIVE
ANTIBODY SCREEN: NEGATIVE
BLOOD TYPE BARCODE: 600
BLOOD TYPE BARCODE: 9500
RH BLOOD TYPE: NEGATIVE
RH BLOOD TYPE: NEGATIVE

## 2020-01-01 PROCEDURE — 36430 TRANSFUSION BLD/BLD COMPNT: CPT

## 2020-01-01 PROCEDURE — 84132 ASSAY OF SERUM POTASSIUM: CPT | Performed by: INTERNAL MEDICINE

## 2020-01-01 PROCEDURE — 86901 BLOOD TYPING SEROLOGIC RH(D): CPT

## 2020-01-01 PROCEDURE — 80048 BASIC METABOLIC PNL TOTAL CA: CPT | Performed by: INTERNAL MEDICINE

## 2020-01-01 PROCEDURE — 85018 HEMOGLOBIN: CPT | Performed by: HOSPITALIST

## 2020-01-01 PROCEDURE — 36415 COLL VENOUS BLD VENIPUNCTURE: CPT

## 2020-01-01 PROCEDURE — 85027 COMPLETE CBC AUTOMATED: CPT | Performed by: INTERNAL MEDICINE

## 2020-01-01 PROCEDURE — 81001 URINALYSIS AUTO W/SCOPE: CPT | Performed by: EMERGENCY MEDICINE

## 2020-01-01 PROCEDURE — 86920 COMPATIBILITY TEST SPIN: CPT

## 2020-01-01 PROCEDURE — 30233N1 TRANSFUSION OF NONAUTOLOGOUS RED BLOOD CELLS INTO PERIPHERAL VEIN, PERCUTANEOUS APPROACH: ICD-10-PCS | Performed by: INTERNAL MEDICINE

## 2020-01-01 PROCEDURE — 80053 COMPREHEN METABOLIC PANEL: CPT | Performed by: EMERGENCY MEDICINE

## 2020-01-01 PROCEDURE — 97165 OT EVAL LOW COMPLEX 30 MIN: CPT

## 2020-01-01 PROCEDURE — 85610 PROTHROMBIN TIME: CPT | Performed by: EMERGENCY MEDICINE

## 2020-01-01 PROCEDURE — 86850 RBC ANTIBODY SCREEN: CPT

## 2020-01-01 PROCEDURE — 99285 EMERGENCY DEPT VISIT HI MDM: CPT

## 2020-01-01 PROCEDURE — 86900 BLOOD TYPING SEROLOGIC ABO: CPT

## 2020-01-01 PROCEDURE — 0DJ08ZZ INSPECTION OF UPPER INTESTINAL TRACT, VIA NATURAL OR ARTIFICIAL OPENING ENDOSCOPIC: ICD-10-PCS | Performed by: INTERNAL MEDICINE

## 2020-01-01 PROCEDURE — 93005 ELECTROCARDIOGRAM TRACING: CPT

## 2020-01-01 PROCEDURE — 82272 OCCULT BLD FECES 1-3 TESTS: CPT

## 2020-01-01 PROCEDURE — 86900 BLOOD TYPING SEROLOGIC ABO: CPT | Performed by: EMERGENCY MEDICINE

## 2020-01-01 PROCEDURE — 86901 BLOOD TYPING SEROLOGIC RH(D): CPT | Performed by: EMERGENCY MEDICINE

## 2020-01-01 PROCEDURE — C9113 INJ PANTOPRAZOLE SODIUM, VIA: HCPCS | Performed by: INTERNAL MEDICINE

## 2020-01-01 PROCEDURE — 96366 THER/PROPH/DIAG IV INF ADDON: CPT

## 2020-01-01 PROCEDURE — 97535 SELF CARE MNGMENT TRAINING: CPT

## 2020-01-01 PROCEDURE — 87186 SC STD MICRODIL/AGAR DIL: CPT | Performed by: EMERGENCY MEDICINE

## 2020-01-01 PROCEDURE — 96365 THER/PROPH/DIAG IV INF INIT: CPT

## 2020-01-01 PROCEDURE — 85730 THROMBOPLASTIN TIME PARTIAL: CPT | Performed by: EMERGENCY MEDICINE

## 2020-01-01 PROCEDURE — 86850 RBC ANTIBODY SCREEN: CPT | Performed by: EMERGENCY MEDICINE

## 2020-01-01 PROCEDURE — 87086 URINE CULTURE/COLONY COUNT: CPT | Performed by: EMERGENCY MEDICINE

## 2020-01-01 PROCEDURE — 71045 X-RAY EXAM CHEST 1 VIEW: CPT | Performed by: EMERGENCY MEDICINE

## 2020-01-01 PROCEDURE — 87077 CULTURE AEROBIC IDENTIFY: CPT | Performed by: EMERGENCY MEDICINE

## 2020-01-01 PROCEDURE — 85025 COMPLETE CBC W/AUTO DIFF WBC: CPT | Performed by: EMERGENCY MEDICINE

## 2020-01-01 PROCEDURE — 93010 ELECTROCARDIOGRAM REPORT: CPT

## 2020-01-01 PROCEDURE — 85018 HEMOGLOBIN: CPT | Performed by: INTERNAL MEDICINE

## 2020-01-01 PROCEDURE — 97161 PT EVAL LOW COMPLEX 20 MIN: CPT

## 2020-01-01 PROCEDURE — C9113 INJ PANTOPRAZOLE SODIUM, VIA: HCPCS | Performed by: EMERGENCY MEDICINE

## 2020-01-01 PROCEDURE — 96361 HYDRATE IV INFUSION ADD-ON: CPT

## 2020-01-01 PROCEDURE — 97116 GAIT TRAINING THERAPY: CPT

## 2020-01-01 RX ORDER — ACETAMINOPHEN 325 MG/1
650 TABLET ORAL ONCE
Status: CANCELLED | OUTPATIENT
Start: 2020-01-01

## 2020-01-01 RX ORDER — POTASSIUM CHLORIDE 14.9 MG/ML
20 INJECTION INTRAVENOUS ONCE
Status: COMPLETED | OUTPATIENT
Start: 2020-01-01 | End: 2020-01-01

## 2020-01-01 RX ORDER — SODIUM CHLORIDE, SODIUM LACTATE, POTASSIUM CHLORIDE, CALCIUM CHLORIDE 600; 310; 30; 20 MG/100ML; MG/100ML; MG/100ML; MG/100ML
INJECTION, SOLUTION INTRAVENOUS CONTINUOUS
Status: DISCONTINUED | OUTPATIENT
Start: 2020-01-01 | End: 2020-01-01

## 2020-01-01 RX ORDER — ONDANSETRON 2 MG/ML
4 INJECTION INTRAMUSCULAR; INTRAVENOUS AS NEEDED
Status: DISCONTINUED | OUTPATIENT
Start: 2020-01-01 | End: 2020-01-01 | Stop reason: HOSPADM

## 2020-01-01 RX ORDER — ACETAMINOPHEN 325 MG/1
650 TABLET ORAL ONCE
Status: COMPLETED | OUTPATIENT
Start: 2020-01-01 | End: 2020-01-01

## 2020-01-01 RX ORDER — CEFDINIR 300 MG/1
300 CAPSULE ORAL 2 TIMES DAILY
Qty: 10 CAPSULE | Refills: 0 | Status: SHIPPED | OUTPATIENT
Start: 2020-01-01 | End: 2020-01-01 | Stop reason: ALTCHOICE

## 2020-01-01 RX ORDER — LIDOCAINE HYDROCHLORIDE 10 MG/ML
INJECTION, SOLUTION EPIDURAL; INFILTRATION; INTRACAUDAL; PERINEURAL AS NEEDED
Status: DISCONTINUED | OUTPATIENT
Start: 2020-01-01 | End: 2020-01-01 | Stop reason: SURG

## 2020-01-01 RX ORDER — ACETAMINOPHEN 325 MG/1
650 TABLET ORAL EVERY 6 HOURS PRN
Status: DISCONTINUED | OUTPATIENT
Start: 2020-01-01 | End: 2020-01-01

## 2020-01-01 RX ORDER — ONDANSETRON 2 MG/ML
4 INJECTION INTRAMUSCULAR; INTRAVENOUS EVERY 6 HOURS PRN
Status: DISCONTINUED | OUTPATIENT
Start: 2020-01-01 | End: 2020-01-01

## 2020-01-01 RX ORDER — SODIUM CHLORIDE 9 MG/ML
INJECTION, SOLUTION INTRAVENOUS ONCE
Status: DISCONTINUED | OUTPATIENT
Start: 2020-01-01 | End: 2020-01-01

## 2020-01-01 RX ORDER — DICLOFENAC SODIUM 75 MG/1
75 TABLET, DELAYED RELEASE ORAL DAILY
COMMUNITY
End: 2020-01-01

## 2020-01-01 RX ORDER — DONEPEZIL HYDROCHLORIDE 10 MG/1
10 TABLET, FILM COATED ORAL DAILY
Status: DISCONTINUED | OUTPATIENT
Start: 2020-01-01 | End: 2020-01-01

## 2020-01-01 RX ORDER — SODIUM CHLORIDE 9 MG/ML
INJECTION, SOLUTION INTRAVENOUS CONTINUOUS
Status: ACTIVE | OUTPATIENT
Start: 2020-01-01 | End: 2020-01-01

## 2020-01-01 RX ORDER — METOCLOPRAMIDE HYDROCHLORIDE 5 MG/ML
10 INJECTION INTRAMUSCULAR; INTRAVENOUS EVERY 8 HOURS PRN
Status: DISCONTINUED | OUTPATIENT
Start: 2020-01-01 | End: 2020-01-01

## 2020-01-01 RX ORDER — SODIUM CHLORIDE 9 MG/ML
INJECTION, SOLUTION INTRAVENOUS ONCE
Status: COMPLETED | OUTPATIENT
Start: 2020-01-01 | End: 2020-01-01

## 2020-01-01 RX ORDER — SODIUM CHLORIDE 9 MG/ML
INJECTION, SOLUTION INTRAVENOUS CONTINUOUS
Status: DISCONTINUED | OUTPATIENT
Start: 2020-01-01 | End: 2020-01-01

## 2020-01-01 RX ORDER — SODIUM CHLORIDE 9 MG/ML
125 INJECTION, SOLUTION INTRAVENOUS CONTINUOUS
Status: DISCONTINUED | OUTPATIENT
Start: 2020-01-01 | End: 2020-01-01

## 2020-01-01 RX ORDER — POTASSIUM CHLORIDE 20 MEQ/1
40 TABLET, EXTENDED RELEASE ORAL ONCE
Status: COMPLETED | OUTPATIENT
Start: 2020-01-01 | End: 2020-01-01

## 2020-01-01 RX ORDER — NALOXONE HYDROCHLORIDE 0.4 MG/ML
80 INJECTION, SOLUTION INTRAMUSCULAR; INTRAVENOUS; SUBCUTANEOUS AS NEEDED
Status: DISCONTINUED | OUTPATIENT
Start: 2020-01-01 | End: 2020-01-01 | Stop reason: HOSPADM

## 2020-01-01 RX ADMIN — LIDOCAINE HYDROCHLORIDE 30 MG: 10 INJECTION, SOLUTION EPIDURAL; INFILTRATION; INTRACAUDAL; PERINEURAL at 14:14:00

## 2020-01-01 RX ADMIN — SODIUM CHLORIDE: 9 INJECTION, SOLUTION INTRAVENOUS at 14:33:00

## 2020-01-01 RX ADMIN — ACETAMINOPHEN 650 MG: 325 TABLET ORAL at 13:41:00

## 2020-01-01 RX ADMIN — SODIUM CHLORIDE: 9 INJECTION, SOLUTION INTRAVENOUS at 14:02:00

## 2020-01-01 RX ADMIN — ACETAMINOPHEN 650 MG: 325 TABLET ORAL at 10:09:00

## 2020-01-27 PROBLEM — D64.9 ANEMIA, UNSPECIFIED: Status: ACTIVE | Noted: 2020-01-01

## 2020-01-28 NOTE — PROGRESS NOTES
Education Record    Learner:  Patient    Disease / Diagnosis: Anemia - blood transfusion    Barriers / Limitations:  None   Comments:    Method:  Brief focused and Reinforcement   Comments:    General Topics:  Plan of care reviewed   Comments:    Outcome:

## 2020-02-25 NOTE — TELEPHONE ENCOUNTER
Spoke with dtr. Aware received orders from Dr. Ladd Query for 2 units PRBC. Reviewed procedure with dtr- requesting PLFD location. Aware of location and time of appointment.   Dtr states pt can ambulate to BR independently and does not become confused as dtr pl

## 2020-02-27 NOTE — PROGRESS NOTES
Education Record    Learner:  Patient    Disease / Diagnosis: 2 units prbc per Dr. Rio Garcia.     Barriers / Limitations:  None   Comments:    Method:  Brief focused   Comments:    General Topics:  Plan of care reviewed   Comments:    Outcome:  Shows understandi

## 2020-05-27 PROBLEM — R73.9 HYPERGLYCEMIA: Status: ACTIVE | Noted: 2020-01-01

## 2020-05-27 PROBLEM — D64.9 SYMPTOMATIC ANEMIA: Status: ACTIVE | Noted: 2020-01-01

## 2020-05-27 PROBLEM — R79.89 AZOTEMIA: Status: ACTIVE | Noted: 2020-01-01

## 2020-05-27 PROBLEM — K92.2 GASTROINTESTINAL HEMORRHAGE, UNSPECIFIED GASTROINTESTINAL HEMORRHAGE TYPE: Status: ACTIVE | Noted: 2020-01-01

## 2020-05-27 PROBLEM — N17.9 ACUTE KIDNEY INJURY (HCC): Status: ACTIVE | Noted: 2020-01-01

## 2020-05-27 PROBLEM — D64.9 ANEMIA: Status: ACTIVE | Noted: 2020-01-01

## 2020-05-27 NOTE — H&P
[facility] Hospitalist History and Physical      Patient presents with:  Abnormal Labs       PCP: Rocío Alejandro MD      History of Present Illness: Patient is a [de-identified]year old female with PMH sig for OA, breast cancer who presents for eval of symptomatic anemia. SURGICAL [facility]        ALL:    No Known Allergies           gabapentin 100 MG Oral Cap, Take 1 capsule (100 mg total) by mouth every morning. palbociclib 100 MG Oral Cap, Take 1 capsule (100 mg total) by mouth daily for 21 days.  followed by 7 days of General:  Alert, no distress, appears stated age. Head:  Normocephalic, without obvious abnormality, atraumatic. Eyes:  Sclera anicteric, No conjunctival pallor, EOMs intact. Nose: Nares normal. Septum midline. Mucosa normal. No drainage.    Nirmal Mattes the lungs. This is somewhat nonspecific. Pneumonia may be possible in the appropriate clinical setting. Underlying masses or nodules may also be possible. This could be best assessed with dedicated chest CT. No pleural effusion is seen.   CONCLUSION:

## 2020-05-27 NOTE — ED INITIAL ASSESSMENT (HPI)
Per patient, low HgB readings this morning and also last week, which led to infusion. Has a cough which is always there due to smoking. Also complaining of vomiting; beige color vomit. Denies any other new symptoms.

## 2020-05-27 NOTE — ED PROVIDER NOTES
Patient Seen in: [facility] Emergency Department      History   Patient presents with:  Abnormal Labs    Stated Complaint: low hgb    HPI    79-year-old female here for low hemoglobin.   This patient has a history of recurrent metastatic breast cance Darling Velásquez MD [facility]   • OTHER Left 2010    left humerous   • OTHER SURGICAL HISTORY  2011    left shoulder ORIF   • OTHER SURGICAL HISTORY Left     ORIF left arm with screws    • RADIATION LEFT  2015    6 weeks   • VERTIFLEX N/A 1/15/ MORPHOLOGY SCAN - Abnormal; Notable for the following components:    RBC Morphology See morphology below (*)     All other components within normal limits   CBC W/ DIFFERENTIAL - Abnormal; Notable for the following components:    WBC 1.4 (*)     RBC 1.36 ( Protonix. Admission disposition: 5/27/2020  6:10 PM       A total of 30 minutes of critical care time (exclusive of billable procedures) was administered to manage the patient's critical lab values due to her severe anemia.   This involved direct patient i

## 2020-05-28 NOTE — OPERATIVE REPORT
[facility]  Esophagogastroduodenoscopy Report    Marcie Cowden Patient Status:  Inpatient    1940 MRN [id number]   [facility] ENDOSCOPY Attending Leslie Felix MD      DATE OF OPERATION: 2020     PREOPERATIVE Sujata Raygoza

## 2020-05-28 NOTE — PROGRESS NOTES
Unable to complete medication reconciliation with the patient. Per pt her daughter is in charge of her medications.  Will endorse to oncoming shift in am.

## 2020-05-28 NOTE — PLAN OF CARE
Assumed care of patient at 0730. Patient alert and oriented x4, forgetful but able to reorient. Pt down for EGD, no interventions. Per GI, protonix gtt d/c, no need for oral ppi. No melena. Repeat CBC, Hgb 6.4, 1 u PRBC infusing. Pt denies cp or sob.  Pt to

## 2020-05-28 NOTE — CONSULTS
Pulmonary H&P/Consult       NAME: Aure Fei 68: [id number]/[id number] - MRN: [id number] - Age: [de-identified]year old - :  1940    Date of Admission: 2020  3:53 PM  Admission Diagnosis: Symptomatic anemia [D64.9]  Gastrointestinal hemorrhage, unspecified gas Performed by Sapna Draper MD at [address] Left 10/22/2018    Performed by Sapna Draper MD at . [facility] 139 N/A 5/6/2019 Take 1 tablet by mouth daily. , Disp: , Rfl: , Taking  MELATONIN OR, Take 1 tablet by mouth daily. , Disp: , Rfl: , Taking  COQ10 100 MG OR CAPS, Take 4 capsules by mouth daily.   , Disp: , Rfl: , Taking  VITAMIN C OR, 1 tablet daily, Disp: , Rfl: , Taking Not Asked        Caffeine Concern: Not Asked        Occupational Exposure: No        Hobby Hazards: Not Asked        Sleep Concern: Not Asked        Stress Concern: Not Asked        Weight Concern: Not Asked        Special Diet: Not Asked        Back Care: HEMATOLOGIC:  See above   ENDOCRINE:  denies thyroid history      OBJECTIVE:   05/28/20  0442 05/28/20  0500 05/28/20  0600 05/28/20  0700   BP:  134/68 122/53 126/75   BP Location:       Pulse:  84 85 101   Resp:  20 15 23   Temp:       TempSrc:       S no rashes or lesions   Neurologic:   CNII-XII intact. Normal strength, sensation and reflexes       throughout         Labs reviewed as noted below    Imaging: chest x-ray reviewed- mult opacities noted bilaterally    ASSESSMENT/PLAN:    1.  Acute blood los

## 2020-05-28 NOTE — ANESTHESIA POSTPROCEDURE EVALUATION
[address] Patient Status:  Inpatient   Age/Gender [de-identified]year old female MRN [id number]   Location [address] [facility]. Attending Ni Coughlin, [address] [facility] Day # 1 PCP Crystal Rosa MD       Anesthesia Post-op Note    Proce

## 2020-05-28 NOTE — CONSULTS
[address] [facility] Patient Status:  Inpatient   Date of Birth 7/6/1940 MRN [id number]   [facility] 6NE-A Attending Sarah Jewell MD   [facility] Day # 1 PCP Jayashree Nelson MD     Date of [facility] Provider Donavan Muhammad MD    Medication Donepezil HCl 10 MG Oral Tab, Sig Take 1 tablet (10 mg total) by mouth once daily. , Start Date 7/3/19, End Date , Taking? , Authorizing Provider Sherren Rasher, MD    Medication Multiple Vitamins-Minerals (CENTRUM SI Continuous  acetaminophen (TYLENOL) tab 650 mg, 650 mg, Oral, Q6H PRN  0.9% NaCl infusion, , Intravenous, Continuous  ondansetron HCl (ZOFRAN) injection 4 mg, 4 mg, Intravenous, Q6H PRN  Metoclopramide HCl (REGLAN) injection 10 mg, 10 mg, Intravenous, Q8H 1.08 05/27/2020    PTP 14.3 05/27/2020      Assessment:    1. Anemia  2. Melena    The patient has pancytopenia. The anemia could be related to her bone marrow. However, she did have melena. I cannot exclude upper GI bleed. She is better with transfusion.

## 2020-05-28 NOTE — PROGRESS NOTES
[facility] Hospitalist Progress Note                                                                   [address]  7/6/1940    SUBJECTIVE:  No active bleeding. Denies abdominal pain.   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    - now 7.2 this am after 3 units prbc.     - plan EGD today    # pancytope

## 2020-05-28 NOTE — ANESTHESIA PREPROCEDURE EVALUATION
PRE-OP EVALUATION    Patient Name: Ramez Aaron    Pre-op Diagnosis: Anemia, Melena    Procedure(s):  Esophagogastroduodenoscopy    Surgeon(s) and Role:     Shaniqua Reynolds MD - Primary    Pre-op vitals reviewed.   Temp: 98.2 °F (36.8 °C)  Pulse: 98  Res (CITRACAL + D OR), Take 1 tablet by mouth daily. , Disp: , Rfl:   denosumab (XGEVA) 120 MG/1.7ML Subcutaneous Solution, Inject 120 mg into the skin every 30 (thirty) days. , Disp: , Rfl:   Glucosamine-Chondroitin (GLUCOSAMINE CHONDR COMPLEX OR), Take 1 capsu Acute kidney injury ([facility] [address]     Azotemia     Hyperglycemia     Symptomatic anemia     Gastrointestinal hemorrhage, unspecified gastrointestinal hemorrhage type     Anemia          Past Surgical History:   Procedure Laterality Date   • BENIGN BIOPSY LEFT  199 Date    WBC 1.2 (L) 05/28/2020    WBC 1.91 (LL) 05/27/2020    RBC 2.36 (L) 05/28/2020    RBC 1.58 (L) 05/27/2020    HGB 7.2 (L) 05/28/2020    HGB 4.9 (LL) 05/27/2020    HCT 22.5 (L) 05/28/2020    HCT 15.8 (L) 05/27/2020    MCV 95.3 05/28/2020    .0

## 2020-05-28 NOTE — CONSULTS
[facility]  Report of Consultation    Eusebioalicia Lagunas Patient Status:  Inpatient    1940 MRN [id number]   [facility] 6NE-A Attending Adamaris Silva MD   [address] Day # 1 PCP Tigre Wilkinson MD     REASON FOR CONSULTATION:     An • LUMPECTOMY LEFT     • MASTECTOMY LEFT  1/2015    [facility]   • MASTECTOMY UNILATERAL Right 1/15/2015    Performed by Atilio Stanford MD at [facility]   • OTHER Left 2010    left humerous   • OTHER SURGICAL HISTORY  2011    left shoulder ORIF (137 lb 12.6 oz)   SpO2 94%   BMI 24.41 kg/m²    HEENT: No Icterus. Neck:  No palpable lymphadenopathy. Neck is supple. Chest: Clear to auscultation. Heart: Regular rate and rhythm. Abdomen: firm with some tenderness. Extremities: No edema. 4.27 %   COMP METABOLIC PANEL (14)    Collection Time: 05/27/20  4:32 PM   Result Value Ref Range    Glucose 108 (H) 70 - 99 mg/dL    Sodium 140 136 - 145 mmol/L    Potassium 4.4 3.5 - 5.1 mmol/L    Chloride 111 98 - 112 mmol/L    CO2 25.0 21.0 - 32.0 mmol Immature Granulocyte Absolute 0.01 0.00 - 1.00 x10(3) uL    Neutrophil % 46.5 %    Lymphocyte % 33.3 %    Monocyte % 18.1 %    Eosinophil % 1.4 %    Basophil % 0.0 %    Immature Granulocyte % 0.7 %   SCAN SLIDE    Collection Time: 05/27/20  4:32 PM   Resul Expiration Date [id number]     Blood Type Barcode 0600    EKG 12-LEAD    Collection Time: 05/27/20  8:40 PM   Result Value Ref Range    Ventricular rate 102 BPM    Atrial rate 102 BPM    P-R Interval 178 ms    QRS Duration 76 ms    Q-T Interval 342 ms 15.0 %    RDW-SD 56.1 (H) 35.1 - 46.3 fL       Recent Labs   Lab 05/27/20  1632 05/28/20  0011 05/28/20  0552   RBC 1.36* 1.98* 2.36*   HGB 4.2* 5.9* 7.2*   HCT 13.7* 18.6* 22.5*   .7* 93.9 95.3   MCH 30.9 29.8 30.5   MCHC 30.7* 31.7 32.0   RDW 22. 3 to arnoldo  Will hold the medication    3. Anemia likely medication related but cannot rule out GI loss  Agree with EGD. Platelets are 60.  Should be ok to proceed with EGD  We will monitor     We will follow    Thank you for allowing me to participate i

## 2020-05-28 NOTE — PLAN OF CARE
Received pt from ED, A&O x4 but forgetful. Following commands appropriately. Denies SOB, no respiratory distress noted. Continues with 2 L NC, SpO2 in mid 90s. SR/ST w/PVCs, VSS. Had one formed, black BM. No other signs of bleeding noted. Denies nausea.  NP

## 2020-05-28 NOTE — ED NOTES
Assisted Pt to bathroom via bedpan, Pt was able to use w/ assistance w/o difficulty, obtained urine sample, which was sent to lab for analysis.

## 2020-05-29 NOTE — PROGRESS NOTES
[facility]  Progress Note    Niles Meckel Patient Status:  Inpatient    1940 MRN [id number]   [facility] 6NE-A Attending Kimberly Quintana, DO   Hosp Day # 2 PCP Jayashree Nelson MD     Subjective:    She feels well    Objective:  Bl 05/29/20  5:34 AM   Result Value Ref Range    WBC 1.4 (L) 4.0 - 11.0 x10(3) uL    RBC 2.87 (L) 3.80 - 5.30 x10(6)uL    HGB 8.6 (L) 12.0 - 16.0 g/dL    HCT 26.4 (L) 35.0 - 48.0 %    PLT 67.0 (L) 150.0 - 450.0 10(3)uL    MCV 92.0 80.0 - 100.0 fL    MCH 30.0

## 2020-05-29 NOTE — PLAN OF CARE
Assumed care of Jenni @ approximately 8056: awake, alert, oriented to self and occasionally place/situation, requiring increasing reorientation overnight, pleasant, and cooperative with care; on room air w/strong productive; ST/NSR on the monitor; 1 unit P handout, if applicable  - Encourage broncho-pulmonary hygiene including cough, deep breathe, Incentive Spirometry  - Assess the need for suctioning and perform as needed  - Assess and instruct to report SOB or any respiratory difficulty  - Respiratory Ther

## 2020-05-29 NOTE — PROGRESS NOTES
Pulmonary Progress Note        NAME: Aure Fei 68: [id number] - MRN: [id number] - Age: [de-identified]year old - : 1940        Last 24hrs: No events overnight, feels ok, wants to go home today    OBJECTIVE:   20  0500 20  0600 20  0700 citrobacter noted  -abx per IM  7. FEN  -diet as tolerated  -monitor lytes, replace as needed  8. Proph  -SCDs  -PPI  9.  Dispo  -stable to transfer to floor  -will follow PRN out of ICU                   Naren Crouch [facility]  [facility] Pulmonary and Critical Care

## 2020-05-29 NOTE — PROGRESS NOTES
Assumed patient care at . A/O x 4., forgetful. RA.  IV abx for UTI. Hgb Q 8. PT/OT to eval. Regular diet. Denies pain. Possible d/c tomorrow. Fall risk precautions in place. All needs met.

## 2020-05-29 NOTE — PROGRESS NOTES
[facility]  GI Progress Note      Emperatriz Ovalles Patient Status:  Inpatient    1940 MRN [id number]   [facility] 6NE-A Attending Dee Shea, DO   Hosp Day # 2 PCP Cheryl Jewell MD          SUBJECTIVE:     No problems after EGD

## 2020-05-29 NOTE — PLAN OF CARE
Assumed care at 0730, patient not on any continuous infusions, tolerating sitting up in the chair for all meals. Encouraged to stay up and out of bed during most of the day to regulate sleeping at night, unsuccessfully.  PT/OT consulted to evaluate patient

## 2020-05-29 NOTE — PROGRESS NOTES
[facility] Hospitalist Progress Note     PCP: Sharon Valencia MD    SUBJECTIVE:  No CP, SOB, or N/V.  RN notes patient has been impulsive, forgetful. +urinary frequency. Seems unsteady. Pt lives alone.     OBJECTIVE:  Temp:  [98.4 °F (36.9 °C)-99.8 °F (37.7 10 mg Oral Daily     • lactated ringers     • sodium chloride 125 mL/hr (05/27/20 1705)   • sodium chloride 100 mL/hr at 05/28/20 1207     acetaminophen, ondansetron HCl, Metoclopramide HCl       Assessment/Plan:     Principal Problem:    Symptomatic anemi

## 2020-05-30 NOTE — HOME CARE LIAISON
MET WITH PTNT AND OFFERED CHOICE  OF AGENCIES. PTNT AGREEABLE TO [facility]. MET WITH PTNT TO DISCUSS HOME HEALTH SERVICES AND COVERAGE CRITERIA. PTNT AGREEABLE TO [facility]iel Tr[facility]. PTNT GIVEN RESIDENTIAL BROCHURE.  RESIDENTIAL WITH PROVIDE SN/PT ON DISC

## 2020-05-30 NOTE — PLAN OF CARE
Pt. A&O x4 but forgetful and impulsive regarding safety instructions. On RA, tele shows NSR with occasional PVCs. IVs saline locked. VSS. Left arm precautions maintained. Hemoglobin checks done Q8.  Awaiting PT/OT eval in AM. Fall and safety precautions in broncho-pulmonary hygiene including cough, deep breathe, Incentive Spirometry  - Assess the need for suctioning and perform as needed  - Assess and instruct to report SOB or any respiratory difficulty  - Respiratory Therapy support as indicated  - Manage/a discharged home    Interventions:  - hgb stable  - complete EGD  - resume diet  - ambulate  - See additional Care Plan goals for specific interventions   Outcome: Progressing  Goal: Patient/Family Short Term Goal  Description  Patient's Short Term Goal: hg

## 2020-05-30 NOTE — OCCUPATIONAL THERAPY NOTE
OCCUPATIONAL THERAPY QUICK EVALUATION - INPATIENT    Room Number: 0635/6403-F  Evaluation Date: 5/30/2020     Type of Evaluation: Quick Eval  Presenting Problem: symptomatic anemia; GI hemorrhage,    Physician Order: IP Consult to Occupational Therapy  [facility]   • LUMBAR RADIOFREQUENCY N/A 5/6/2019    Performed by Arminda Jay MD at [address]   • LUMPECTOMY LEFT     • MASTECTOMY LEFT  1/2015    [address] [facility] [place]   • MASTECTOMY UNILATERAL Right 1/15/2015    Performed by Mark Corrales, ASSESSMENT  AM-PAC ‘6-Clicks’ Inpatient Daily Activity Short Form   How much help from another person does the patient currently need…  -   Putting on and taking off regular lower body clothing?: A Little   -   Bathing (including washing, rinsing, drying)? engagement in ADL/IADL  MODERATE  3 - 5 performance deficits   Client Assessment/Performance Deficits  MODERATE - Comorbidities and min to mod modifications of tasks    Clinical Decision Making  MODERATE - Analysis of occupational profile, detailed assessm

## 2020-05-30 NOTE — PROGRESS NOTES
[facility]  Progress Note    Gina Leblanc Patient Status:  Inpatient    1940 MRN [id number]   [facility] 6NE-A Attending Bandar Resendiz, DO   Hosp Day # 3 PCP Js Grayson MD     Subjective:    She feels well    Objective:  Bl 1.4*   PLT 74.0*   < > 67.0* 66.0*  --  69.0*    < > = values in this interval not displayed. Imaging:          Medications reviewed.     • cefTRIAXone  1 g Intravenous Q24H   • sodium chloride   Intravenous Once   • Donepezil HCl  10 mg Oral Abraham

## 2020-05-30 NOTE — PROGRESS NOTES
[facility] Hospitalist Progress Note     [facility]      SUBJECTIVE:  No CP, SOB, or N/V.   Feeling well    OBJECTIVE:  Temp:  [97.6 °F (36.4 °C)-98.6 °F (37 °C)] 98.6 °F (37 °C)  Pulse:  [] 99  Resp:  [17-26] 26  BP: ()/(53-81) 111/53    Intake radiograph was obtained. COMPARISON:  TORIE DALY, CHEST PA   LATERAL, 12/13/2005, 1:41 PM.  INDICATIONS:  low hgb  PATIENT STATED HISTORY: (As transcribed by Technologist)  Patient offered no additional history at this time.     FINDINGS:  Numerous nodular her baseline  -will have PT/OT assess as she lives alone     PPX: SCDs    Dispo: admitted for anemia, home pending PT/OT evaluation    Questions/concerns were discussed with patient and/or family by bedside.     [facility] Heart  Internal Medicine  [facility]

## 2020-05-30 NOTE — CM/SW NOTE
SW received a call from PT. HH recommended. Referral for PT placed and [facility] contacted to review with pt and family. F2F placed. Social work to remain available for support or any discharge planning needs.     Osmany Spears MSW, LCSW   at

## 2020-05-30 NOTE — PLAN OF CARE
Assumed care at 0730, A/Ox4, forgetful and impulsive. R/A w/allen. Tele w occaisonal PVC. Pullup but up w/standby assist to bathroom. Bed/alarm chair alarm. Fall risk r/t impulsivity. Reg diet. Received 1unit PRBC for hgb of 7.2.  Codie alert fired and MD

## 2020-05-30 NOTE — PHYSICAL THERAPY NOTE
PHYSICAL THERAPY QUICK EVALUATION - INPATIENT    Room Number: 2401/2885-C  Evaluation Date: 5/30/2020  Presenting Problem: anemia  Physician Order: PT Eval and Treat    Problem List  Principal Problem:    Symptomatic anemia  Active Problems:    Acute kid 1/15/2020    Performed by Karen Lyons MD at [address]  Type of Home: Townhouse   Home Layout: One level  Stairs to Enter : 2  Railing: Yes          Lives With: Alone  Drives: Yes  Patient Owned Equipment: Rolling wal Skilled Therapy Provided: Pt received ambulating in hallway with RW with nursing staff. Ambulation completed additional 200ft with RW and PT. Pt demos 5 stairs with supervision and 2 railings. Pt not willing to trial with use of 1 railing only.   Pt

## 2020-05-30 NOTE — PROGRESS NOTES
NURSING DISCHARGE NOTE    Discharged Home via [place]. Accompanied by RN  Belongings Taken by patient/family. Discharge paperwork and prescriptions provided and explained to pt and daughter. All questions and concerns addressed.  Pt accompanied by RN t

## 2020-05-31 NOTE — DISCHARGE SUMMARY
General Medicine Discharge Summary     Patient ID:  Eloy Salcedo  [de-identified]year old  7/6/1940    Admit date: 5/27/2020    Discharge date and time: 5/30/2020  6:00 PM     Attending Physician: Rinku Blum MD    Primary Care Physician: Areli Leone MD mg total) by mouth once daily. , Normal, Disp-90 tablet, R-4    Multiple Vitamins-Minerals (CENTRUM SILVER) Oral Tab  Take 1 tablet by mouth daily. , Historical    Calcium Citrate-Vitamin D (CITRACAL + D OR)  Take 1 tablet by mouth daily. , Historical    isael

## 2020-06-10 PROBLEM — I70.8 AORTO-ILIAC ATHEROSCLEROSIS (HCC): Status: ACTIVE | Noted: 2020-01-01

## 2020-06-10 PROBLEM — C80.1: Status: ACTIVE | Noted: 2020-01-01

## 2020-06-10 PROBLEM — D84.9 IMMUNOCOMPROMISED (HCC): Status: ACTIVE | Noted: 2020-01-01

## 2020-06-10 PROBLEM — R63.8: Status: ACTIVE | Noted: 2020-01-01

## 2020-06-10 PROBLEM — I70.0 AORTO-ILIAC ATHEROSCLEROSIS (HCC): Status: ACTIVE | Noted: 2020-01-01

## 2020-06-10 PROBLEM — I77.819 ECTASIS AORTA (HCC): Status: ACTIVE | Noted: 2020-01-01

## 2020-06-18 NOTE — PROGRESS NOTES
Education Record    Learner:  Patient    Disease / Diagnosis: hgb 6.6- per order by Dr. Cerda Getting pt to receive 1 unit prbc- no premeds ordered.     Barriers / Limitations:  None   Comments:    Method:  Brief focused   Comments:    General Topics:  Plan of care

## (undated) DEVICE — MASK ETCO2 PANORAMIC

## (undated) DEVICE — 1200CC GUARDIAN II: Brand: GUARDIAN

## (undated) DEVICE — 3M™ RED DOT™ MONITORING ELECTRODE WITH FOAM TAPE AND STICKY GEL, 50/BAG, 20/CASE, 72/PLT 2570: Brand: RED DOT™

## (undated) DEVICE — ENDOSCOPY PACK UPPER: Brand: MEDLINE INDUSTRIES, INC.

## (undated) DEVICE — Device: Brand: DEFENDO AIR/WATER/SUCTION AND BIOPSY VALVE

## (undated) NOTE — LETTER
Brianna Kumar [address] [address]  [place], [address] [facility]    Consent for Operation  Date: __________________                                Time: _______________    1.  I authorize the performance upon Emperatriz Ovalles the following operation:  Procedure(s procedure has been videotaped, the surgeon will obtain the original videotape. The hospital will not be responsible for storage or maintenance of this tape.   7. For the purpose of advancing medical education, I consent to the admittance of observers to the STATEMENTS REQUIRING INSERTION OR COMPLETION WERE FILLED IN.     Signature of Patient:   ___________________________    When the patient is a minor or mentally incompetent to give consent:  Signature of person authorized to consent for patient: ____________ drugs/illegal medications). Failure to inform my anesthesiologist about these medicines may increase my risk of anesthetic complications. iv. If I am allergic to anything or have had a reaction to anesthesia before.   3. I understand how the anesthesia med I have discussed the procedure and information above with the patient (or patient’s representative) and answered their questions. The patient or their representative has agreed to have anesthesia services.     _______________________________________________

## (undated) NOTE — LETTER
Brianna Kumar [address] [address]  [facility], [address] [facility]    Consent for Operation  Date: __________________                                Time: _______________    1.  I authorize the performance upon Kaylah Aburto the following operation:  Procedure(s procedure has been videotaped, the surgeon will obtain the original videotape. The hospital will not be responsible for storage or maintenance of this tape.   7. For the purpose of advancing medical education, I consent to the admittance of observers to the STATEMENTS REQUIRING INSERTION OR COMPLETION WERE FILLED IN.     Signature of Patient:   ___________________________    When the patient is a minor or mentally incompetent to give consent:  Signature of person authorized to consent for patient: ____________ drugs/illegal medications). Failure to inform my anesthesiologist about these medicines may increase my risk of anesthetic complications. iv. If I am allergic to anything or have had a reaction to anesthesia before.   3. I understand how the anesthesia med I have discussed the procedure and information above with the patient (or patient’s representative) and answered their questions. The patient or their representative has agreed to have anesthesia services.     _______________________________________________

## (undated) NOTE — ED AVS SNAPSHOT
Kaylah Aburto   MRN: [id number]    Department:  [facility] Emergency Department   Date of Visit:  2/19/2018           Disclosure     Insurance plans vary and the physician(s) referred by the ER may not be covered by your plan.  Please contact your tell this physician (or your personal doctor if your instructions are to return to your personal doctor) about any new or lasting problems. The primary care or specialist physician will see patients referred from the [facility] Emergency Department.  Lenka Olivera